# Patient Record
Sex: FEMALE | Race: WHITE | NOT HISPANIC OR LATINO | Employment: FULL TIME | URBAN - METROPOLITAN AREA
[De-identification: names, ages, dates, MRNs, and addresses within clinical notes are randomized per-mention and may not be internally consistent; named-entity substitution may affect disease eponyms.]

---

## 2022-07-20 ENCOUNTER — OFFICE VISIT (OUTPATIENT)
Dept: OBGYN CLINIC | Facility: CLINIC | Age: 42
End: 2022-07-20

## 2022-07-20 VITALS — SYSTOLIC BLOOD PRESSURE: 124 MMHG | DIASTOLIC BLOOD PRESSURE: 76 MMHG | WEIGHT: 214 LBS

## 2022-07-20 DIAGNOSIS — N94.9 GENITAL LESION, FEMALE: Primary | ICD-10-CM

## 2022-07-20 RX ORDER — LIDOCAINE 50 MG/G
OINTMENT TOPICAL AS NEEDED
Qty: 35.44 G | Refills: 0 | Status: SHIPPED | OUTPATIENT
Start: 2022-07-20 | End: 2022-10-04

## 2022-07-20 RX ORDER — VALACYCLOVIR HYDROCHLORIDE 1 G/1
1000 TABLET, FILM COATED ORAL 2 TIMES DAILY
Qty: 20 TABLET | Refills: 0 | Status: SHIPPED | OUTPATIENT
Start: 2022-07-20 | End: 2022-10-04

## 2022-07-20 NOTE — PROGRESS NOTES
[address] [facility], [address], [facility], [address] [facility]    Assessment/Plan:  1  Genital lesion, female  Assessment & Plan:  Patient presents with 3 days of painful lesions on labia bilaterally  Worsen with urine touching them  Denies h/o STI or cold sores in past   No vaginal discharge  Denies new partners in many years  Exam c/w HSV outbreak  Swab collected for PCR testing  Discussed suspicion of genital HSV with patient and recom empiric treatment with Valtrex while awaiting testing  Offered topical lidocaine for immediately treatment  Discussed natural history of HSV, unfortunately some patients do not have symptoms and can shed virus without lesions  It is unclear from exam if this is a first outbreak or recurrence  Patient asking for antibody testing to see if prior infection - agreed but stressed that antibodies are not able to distinguish prior oral vs  Genital infection  Reviewed goal is treatment currently, once confirmed will discuss future management  Reviewed this is a sexually transmitted infection which can be transmitted with and without lesions, through genital and oral sex  Unfortunately it is not curable but symptoms can be managed  Handout from [employer] on HSV provided and link to [employer] website provided  Orders:  -     valACYclovir (VALTREX) 1,000 mg tablet; Take 1 tablet (1,000 mg total) by mouth 2 (two) times a day for 10 days  -     HSV TYPE 1,2 DNA PCR  -     lidocaine (XYLOCAINE) 5 % ointment; Apply topically as needed for mild pain  -     Herpes I/II IgG DRAKE w Reflex to HSV-2; Future  -     Herpes I/II IgG DRAKE w Reflex to HSV-2      Subjective:   Divina Park is a 43 y o  [id number]  female  CC: vaginal blisters      HPI:   Cecil Ramirez presents as a new patient today to our practice with c/o vaginal blisters  She states she noted them first on Monday - multiple bumps on outside labia - mildly tender to touch with clothes  Hot water makes it worse  Wiping uncomfortable  Burning on outside with urinating  No external burning  Slight itching  Worsening in last 3 days  Denies vaginal discharge  States she does shave vulva but this doesn't seem like ingrown hairs to her  Applied cold compresses and some desitin  Desitin did not help  Last gyn 16 months ago when son was born  Last pap 2020, normal, no h/o abnl, no Gardasil  No STIs, currently sexually active, using rhythm/calendar method  No new partners  Periods monthly, extremely heavy since had children  Last 5-7 days and very heavy most day  No pain  Sometimes bleeds through clothing  Tailbone pain since delivery of last son  Has seen orthopedics and dx "dislocated tail bone" - had MRI  They are recom injections to sacrum, no tx yet per patient  Gyn History  Patient's last menstrual period was 2022 (exact date)  Last pap smear:  per pt normal    She  reports being sexually active and has had partner(s) who are male  She reports using the following method of birth control/protection: Rhythm         OB History  [id number]    Past Medical History:  : Asthma      Comment:  seasonal  : Coccyx pain      Comment:  post 2nd delivery of child, seeing orthopedics  2018: Female infertility  No date: Paroxysmal atrial fibrillation ([facility] [address] )      Comment:  during first pregnancy, resolved     Past Surgical History:  2019,2021:  SECTION  2018: DILATION AND EVACUATION  2018: OPERATIVE HYSTEROSCOPY      Comment:  partial uterine septum removal by infertily     Social History     Tobacco Use    Smoking status: Never Smoker    Smokeless tobacco: Never Used   Vaping Use    Vaping Use: Never used   Substance Use Topics    Alcohol use: Not Currently    Drug use: Never          Current Outpatient Medications:     lidocaine (XYLOCAINE) 5 % ointment, Apply topically as needed for mild pain, Disp: 35 44 g, Rfl: 0    valACYclovir (VALTREX) 1,000 mg tablet, Take 1 tablet (1,000 mg total) by mouth 2 (two) times a day for 10 days, Disp: 20 tablet, Rfl: 0    She has No Known Allergies       ROS: Review of Systems   Constitutional: Negative  Gastrointestinal: Negative  Genitourinary: Positive for dysuria (burning of labia when urine touches lesions) and genital sores  Negative for pelvic pain, urgency, vaginal bleeding and vaginal discharge  Psychiatric/Behavioral: Negative  Objective:  /76   Wt 97 1 kg (214 lb)   LMP 07/02/2022 (Exact Date)   Breastfeeding No      Physical Exam  Constitutional:       Appearance: Normal appearance  Genitourinary:     Labia:         Right: Lesion present  Left: Lesion present  Vagina: Normal       Cervix: Normal       Uterus: Normal  Not enlarged and not tender  Adnexa:         Right: No mass or tenderness  Left: No mass or tenderness  Rectum: No external hemorrhoid  Comments: Ulcerative lesions bilaterally labia minora and at perineal body - c/w HSV outbreak  Neurological:      Mental Status: She is alert     Psychiatric:         Mood and Affect: Mood normal          Behavior: Behavior normal

## 2022-07-20 NOTE — ASSESSMENT & PLAN NOTE
Patient presents with 3 days of painful lesions on labia bilaterally  Worsen with urine touching them  Denies h/o STI or cold sores in past   No vaginal discharge  Denies new partners in many years  Exam c/w HSV outbreak  Swab collected for PCR testing  Discussed suspicion of genital HSV with patient and recom empiric treatment with Valtrex while awaiting testing  Offered topical lidocaine for immediately treatment  Discussed natural history of HSV, unfortunately some patients do not have symptoms and can shed virus without lesions  It is unclear from exam if this is a first outbreak or recurrence  Patient asking for antibody testing to see if prior infection - agreed but stressed that antibodies are not able to distinguish prior oral vs  Genital infection  Reviewed goal is treatment currently, once confirmed will discuss future management  Reviewed this is a sexually transmitted infection which can be transmitted with and without lesions, through genital and oral sex  Unfortunately it is not curable but symptoms can be managed  Handout from [facility] on HSV provided and link to [facility] website provided

## 2022-07-20 NOTE — PATIENT INSTRUCTIONS
- Safe sex practices recommended  - Resources - information on birth control and sexually transmitted infections - www bedsider  org            - information on sexually transmitted infections - www cdc gov/std/

## 2022-07-22 LAB
HSV1 DNA SPEC QL NAA+PROBE: DETECTED
HSV2 DNA SPEC QL NAA+PROBE: NOT DETECTED
SPECIMEN SOURCE: ABNORMAL

## 2022-07-25 PROBLEM — A60.04 HERPES SIMPLEX VULVOVAGINITIS: Status: ACTIVE | Noted: 2022-07-20

## 2022-10-04 ENCOUNTER — ANNUAL EXAM (OUTPATIENT)
Dept: OBGYN CLINIC | Facility: CLINIC | Age: 42
End: 2022-10-04

## 2022-10-04 VITALS
SYSTOLIC BLOOD PRESSURE: 134 MMHG | BODY MASS INDEX: 35.36 KG/M2 | HEIGHT: 66 IN | WEIGHT: 220 LBS | DIASTOLIC BLOOD PRESSURE: 74 MMHG

## 2022-10-04 DIAGNOSIS — Z01.419 ENCOUNTER FOR ANNUAL ROUTINE GYNECOLOGICAL EXAMINATION: Primary | ICD-10-CM

## 2022-10-04 DIAGNOSIS — Z12.31 ENCOUNTER FOR SCREENING MAMMOGRAM FOR MALIGNANT NEOPLASM OF BREAST: ICD-10-CM

## 2022-10-04 DIAGNOSIS — A60.04 HERPES SIMPLEX VULVOVAGINITIS: ICD-10-CM

## 2022-10-04 DIAGNOSIS — Z11.3 ROUTINE SCREENING FOR STI (SEXUALLY TRANSMITTED INFECTION): ICD-10-CM

## 2022-10-04 DIAGNOSIS — Z12.4 CERVICAL CANCER SCREENING: ICD-10-CM

## 2022-10-04 RX ORDER — VALACYCLOVIR HYDROCHLORIDE 1 G/1
1000 TABLET, FILM COATED ORAL DAILY
Qty: 5 TABLET | Refills: 6 | Status: SHIPPED | OUTPATIENT
Start: 2022-10-04 | End: 2022-11-08

## 2022-10-04 NOTE — ASSESSMENT & PLAN NOTE
Patient has only had initial outbreak  States her  did have positive antibodies in the past   Gave script for prn dosing

## 2022-10-04 NOTE — PROGRESS NOTES
Annual Wellness Visit  [address], [address], [facility], [address] [facility]    ASSESSMENT/PLAN: Caridad Key is a 43 y o  [id number] who presents for annual gynecologic exam     Encounter for routine gynecologic examination  - Routine well woman exam completed today  - Cervical Cancer Screening: Current ASCCP Guidelines reviewed  Last Pap: 2020 per pt normal  Next Pap Due: today, routine   - STI screening offered including HIV testing: GC/CT done, others declined  - Contraceptive counseling discussed  Current contraception: natural family planning (NFP), long h/o infertility  - Breast Cancer Screening: Last Mammogram not yet done  - The following were reviewed in today's visit: mammography screening ordered, STD testing, family planning choices, exercise and healthy diet    Additional problems addressed during this visit:  1  Encounter for annual routine gynecological examination    2  Cervical cancer screening  -     IGP,CtNg,AptimaHPV,rfx16/18,45    3  Encounter for screening mammogram for malignant neoplasm of breast  -     Mammo screening bilateral w 3d & cad; Future    4  Routine screening for STI (sexually transmitted infection)  -     IGP,CtNg,AptimaHPV,rfx16/18,45    5  Herpes simplex vulvovaginitis  Assessment & Plan:  Patient has only had initial outbreak  States her  did have positive antibodies in the past   Gave script for prn dosing  Orders:  -     valACYclovir (VALTREX) 1,000 mg tablet; Take 1 tablet (1,000 mg total) by mouth daily For 5 days at first sign of HSV outbreak  Next visit: 1 year Wellness      CC:  Annual Gynecologic Examination    HPI: Caridad Key is a 43 y o  [id number] who presents for annual gynecologic examination  She denies any breast, urinary or pelvic issues at today's visit  Periods monthly, first 3 days very heavy, lasting total of 6 days  No significant pain  Feels heaviness has increased since last delivery    Denies h/o fibroids  Calendar method for birth control, long h/o infertility in past      2022 dx with HSV and treated  Denies further outbreaks  This was patient's first outbreak, no new partners  She states she reviewed fertility testing which showed her  had been exposed to HSV in the past       Gyn History  Patient's last menstrual period was 2022  Last Pap: 2020 per pt normal    She  reports being sexually active and has had partner(s) who are male  She reports using the following method of birth control/protection: Rhythm  OB History  [id number]    Past Medical History:  : Asthma      Comment:  seasonal  : Coccyx pain      Comment:  post 2nd delivery of child, seeing orthopedics  2018: Female infertility  No date: Paroxysmal atrial fibrillation ([facility] [address] )      Comment:  during first pregnancy, resolved     Past Surgical History:  2019,2021:  SECTION  2018: DILATION AND EVACUATION  2018: OPERATIVE HYSTEROSCOPY      Comment:  partial uterine septum removal by infertily     Family History   Problem Relation Age of Onset   • Asthma Father    • Diabetes Father    • Heart attack Father    • Heart disease Father    • Lung disease Father    • Stroke Father         Social History     Tobacco Use   • Smoking status: Never Smoker   • Smokeless tobacco: Never Used   Vaping Use   • Vaping Use: Never used   Substance Use Topics   • Alcohol use: Not Currently   • Drug use: Never          Current Outpatient Medications:   •  valACYclovir (VALTREX) 1,000 mg tablet, Take 1 tablet (1,000 mg total) by mouth daily For 5 days at first sign of HSV outbreak , Disp: 5 tablet, Rfl: 6    She has No Known Allergies       ROS negative except as noted in HPI    Objective:  /74 (BP Location: Right arm, Patient Position: Sitting, Cuff Size: Standard)   Ht 5' 6" (1 676 m)   Wt 99 8 kg (220 lb)   LMP 2022   BMI 35 51 kg/m²      Physical Exam  Constitutional:       Appearance: Normal appearance  Chest:   Breasts:      Right: Normal  No mass, tenderness or axillary adenopathy  Left: Normal  No mass, tenderness or axillary adenopathy  Abdominal:      Palpations: Abdomen is soft  Tenderness: There is no abdominal tenderness  Genitourinary:     General: Normal vulva  Vagina: No bleeding or lesions  Cervix: Normal       Uterus: Normal  Not tender  Adnexa:         Right: No mass or tenderness  Left: No mass or tenderness  Rectum: No external hemorrhoid  Musculoskeletal:         General: Normal range of motion  Lymphadenopathy:      Upper Body:      Right upper body: No axillary adenopathy  Left upper body: No axillary adenopathy  Neurological:      Mental Status: She is alert and oriented to person, place, and time     Psychiatric:         Mood and Affect: Mood normal          Behavior: Behavior normal

## 2022-10-04 NOTE — LETTER
October 15, 2022     Sheryl Jenkins, [address] [place] [facility] [address]    Patient: Seferino Landaverde   YOB: 1980   Date of Visit: 10/4/2022       Dear Dr Janie Blevins: Thank you for referring Seferino Landaverde to me for evaluation  Below are my notes for this consultation  If you have questions, please do not hesitate to call me  I look forward to following your patient along with you  Sincerely,        Denise Cobian MD        CC: No Recipients  Denise Cobian MD  10/15/2022  6:06 PM  Sign when Signing Visit  Annual Wellness Visit  [address] [place], [address], [facility], [address]    ASSESSMENT/PLAN: Seferino Landaverde is a 43 y o  [id number] who presents for annual gynecologic exam     Encounter for routine gynecologic examination  - Routine well woman exam completed today  - Cervical Cancer Screening: Current ASCCP Guidelines reviewed  Last Pap: 2020 per pt normal  Next Pap Due: today, routine   - STI screening offered including HIV testing: GC/CT done, others declined  - Contraceptive counseling discussed  Current contraception: natural family planning (NFP), long h/o infertility  - Breast Cancer Screening: Last Mammogram not yet done  - The following were reviewed in today's visit: mammography screening ordered, STD testing, family planning choices, exercise and healthy diet    Additional problems addressed during this visit:  1  Encounter for annual routine gynecological examination    2  Cervical cancer screening  -     IGP,CtNg,AptimaHPV,rfx16/18,45    3  Encounter for screening mammogram for malignant neoplasm of breast  -     Mammo screening bilateral w 3d & cad; Future    4  Routine screening for STI (sexually transmitted infection)  -     IGP,CtNg,AptimaHPV,rfx16/18,45    5  Herpes simplex vulvovaginitis  Assessment & Plan:  Patient has only had initial outbreak    States her  did have positive antibodies in the past   Gave script for prn dosing  Orders:  -     valACYclovir (VALTREX) 1,000 mg tablet; Take 1 tablet (1,000 mg total) by mouth daily For 5 days at first sign of HSV outbreak  Next visit: 1 year Wellness      CC:  Annual Gynecologic Examination    HPI: Sudhakar Renteria is a 43 y o  [id number] who presents for annual gynecologic examination  She denies any breast, urinary or pelvic issues at today's visit  Periods monthly, first 3 days very heavy, lasting total of 6 days  No significant pain  Feels heaviness has increased since last delivery  Denies h/o fibroids  Calendar method for birth control, long h/o infertility in past      2022 dx with HSV and treated  Denies further outbreaks  This was patient's first outbreak, no new partners  She states she reviewed fertility testing which showed her  had been exposed to HSV in the past       Gyn History  Patient's last menstrual period was 2022  Last Pap: 2020 per pt normal    She  reports being sexually active and has had partner(s) who are male  She reports using the following method of birth control/protection: Rhythm         OB History  [id number]    Past Medical History:  : Asthma      Comment:  seasonal  : Coccyx pain      Comment:  post 2nd delivery of child, seeing orthopedics  2018: Female infertility  No date: Paroxysmal atrial fibrillation ([facility] [address] )      Comment:  during first pregnancy, resolved     Past Surgical History:  2019,2021:  SECTION  2018: DILATION AND EVACUATION  2018: OPERATIVE HYSTEROSCOPY      Comment:  partial uterine septum removal by infertily     Family History   Problem Relation Age of Onset   • Asthma Father    • Diabetes Father    • Heart attack Father    • Heart disease Father    • Lung disease Father    • Stroke Father         Social History     Tobacco Use   • Smoking status: Never Smoker   • Smokeless tobacco: Never Used   Vaping Use   • Vaping Use: Never used   Substance Use Topics   • Alcohol use: Not Currently   • Drug use: Never          Current Outpatient Medications:   •  valACYclovir (VALTREX) 1,000 mg tablet, Take 1 tablet (1,000 mg total) by mouth daily For 5 days at first sign of HSV outbreak , Disp: 5 tablet, Rfl: 6    She has No Known Allergies       ROS negative except as noted in HPI    Objective:  /74 (BP Location: Right arm, Patient Position: Sitting, Cuff Size: Standard)   Ht 5' 6" (1 676 m)   Wt 99 8 kg (220 lb)   LMP 09/24/2022   BMI 35 51 kg/m²      Physical Exam  Constitutional:       Appearance: Normal appearance  Chest:   Breasts:      Right: Normal  No mass, tenderness or axillary adenopathy  Left: Normal  No mass, tenderness or axillary adenopathy  Abdominal:      Palpations: Abdomen is soft  Tenderness: There is no abdominal tenderness  Genitourinary:     General: Normal vulva  Vagina: No bleeding or lesions  Cervix: Normal       Uterus: Normal  Not tender  Adnexa:         Right: No mass or tenderness  Left: No mass or tenderness  Rectum: No external hemorrhoid  Musculoskeletal:         General: Normal range of motion  Lymphadenopathy:      Upper Body:      Right upper body: No axillary adenopathy  Left upper body: No axillary adenopathy  Neurological:      Mental Status: She is alert and oriented to person, place, and time     Psychiatric:         Mood and Affect: Mood normal          Behavior: Behavior normal

## 2022-10-08 LAB
C TRACH RRNA CVX QL NAA+PROBE: NEGATIVE
CYTOLOGIST CVX/VAG CYTO: NORMAL
DX ICD CODE: NORMAL
HPV I/H RISK 4 DNA CVX QL PROBE+SIG AMP: NEGATIVE
N GONORRHOEA RRNA CVX QL NAA+PROBE: NEGATIVE
OTHER STN SPEC: NORMAL
PATH REPORT.FINAL DX SPEC: NORMAL
SL AMB NOTE:: NORMAL
SL AMB SPECIMEN ADEQUACY: NORMAL
SL AMB TEST METHODOLOGY: NORMAL

## 2022-10-20 DIAGNOSIS — Z12.31 ENCOUNTER FOR SCREENING MAMMOGRAM FOR MALIGNANT NEOPLASM OF BREAST: ICD-10-CM

## 2023-08-11 NOTE — PATIENT INSTRUCTIONS
- Maintain healthy weight with BMI ideally between 18-25     - Eat a healthy diet, including multiple servings of vegetables and fruits, as well as lean protein sources  - Get at least 150 minutes of moderate aerobic activity or 75 minutes of vigorous aerobic activity a week, or a combination of moderate and vigorous activity  Greater amounts of exercise will provide an even greater health benefit  - Ensure diet provides 1200mg of Calcium daily (divided) and 800IU of vitamin D, or take supplements to meet this  - Safe sex practices recommended  - Resources - information on birth control and sexually transmitted infections - www bedsider  org            - information on sexually transmitted infections - www cdc gov/std/ yes

## 2024-03-04 ENCOUNTER — ANNUAL EXAM (OUTPATIENT)
Dept: OBGYN CLINIC | Facility: CLINIC | Age: 44
End: 2024-03-04
Payer: COMMERCIAL

## 2024-03-04 VITALS
SYSTOLIC BLOOD PRESSURE: 120 MMHG | BODY MASS INDEX: 34.39 KG/M2 | DIASTOLIC BLOOD PRESSURE: 82 MMHG | WEIGHT: 214 LBS | HEIGHT: 66 IN

## 2024-03-04 DIAGNOSIS — A60.04 HERPES SIMPLEX VULVOVAGINITIS: ICD-10-CM

## 2024-03-04 DIAGNOSIS — Z12.31 ENCOUNTER FOR SCREENING MAMMOGRAM FOR MALIGNANT NEOPLASM OF BREAST: Primary | ICD-10-CM

## 2024-03-04 DIAGNOSIS — R10.2 PELVIC PAIN: ICD-10-CM

## 2024-03-04 PROBLEM — E78.2 MIXED HYPERLIPIDEMIA: Status: ACTIVE | Noted: 2023-12-29

## 2024-03-04 PROBLEM — J45.909 ASTHMA: Status: ACTIVE | Noted: 2023-12-04

## 2024-03-04 PROCEDURE — S0612 ANNUAL GYNECOLOGICAL EXAMINA: HCPCS | Performed by: NURSE PRACTITIONER

## 2024-03-04 RX ORDER — ALBUTEROL SULFATE 90 UG/1
AEROSOL, METERED RESPIRATORY (INHALATION)
COMMUNITY

## 2024-03-04 RX ORDER — VALACYCLOVIR HYDROCHLORIDE 1 G/1
1000 TABLET, FILM COATED ORAL DAILY
Qty: 5 TABLET | Refills: 6 | Status: SHIPPED | OUTPATIENT
Start: 2024-03-04 | End: 2024-04-08

## 2024-03-04 RX ORDER — FLUTICASONE PROPIONATE 110 UG/1
AEROSOL, METERED RESPIRATORY (INHALATION)
COMMUNITY

## 2024-03-04 NOTE — PROGRESS NOTES
[facility] - [address], [address], [place] [address]    ASSESSMENT/PLAN: Alis Marcos is a 43 y.o.  who presents for annual gynecologic exam.    Encounter for routine gynecologic examination  - Routine well woman exam completed today.  - Cervical Cancer Screening: Current ASCCP Guidelines reviewed. Last Pap: 10/04/2022 NIL, HPV negative. History of abnormal: no.  - STI screening offered including HIV testing: offered, pt declined  - Contraceptive counseling discussed.  Current contraception: no method:   - Breast Cancer Screening: Last Mammogram 10/06/2022, Normal  - Colorectal cancer screening was not ordered.  - The following were reviewed in today's visit: breast self exam, mammography screening ordered, menopause, exercise, and healthy diet    Additional problems addressed during this visit:  1. Encounter for screening mammogram for malignant neoplasm of breast  -     Mammo screening bilateral w 3d & cad; Future    2. Pelvic pain  -     US pelvis complete non OB; Future    3. Herpes simplex vulvovaginitis  -     valACYclovir (VALTREX) 1,000 mg tablet; Take 1 tablet (1,000 mg total) by mouth daily For 5 days at first sign of HSV outbreak.        CC:  Annual Gynecologic Examination    HPI: Alis Marcos is a 43 y.o.  who presents for annual gynecologic examination. She is complaining of pain on the left side of her pelvis, which seems to happen around ovulation or after. She is otherwise doing well.     ROS: Negative except as noted in HPI    Patient's last menstrual period was 2024.       She  reports being sexually active and has had partner(s) who are male. She reports using the following method of birth control/protection: Rhythm.       The following portions of the patient's history were reviewed and updated as appropriate:   Past Medical History:   Diagnosis Date    Asthma     seasonal    Coccyx pain     post 2nd delivery of child, seeing orthopedics     Female infertility 2018    Genital herpes     Paroxysmal atrial fibrillation (HCC)     during first pregnancy, resolved     Past Surgical History:   Procedure Laterality Date     SECTION  2019,2021    DILATION AND EVACUATION  2018    HAND LIGAMENT RECONSTRUCTION Right     2023    KNEE LIGAMENT RECONSTRUCTION Left 2023    OPERATIVE HYSTEROSCOPY  2018    partial uterine septum removal by infertily     Family History   Problem Relation Age of Onset    Asthma Father     Diabetes Father     Heart attack Father     Heart disease Father     Lung disease Father     Stroke Father      Social History     Socioeconomic History    Marital status: /Civil Union     Spouse name: None    Number of children: None    Years of education: None    Highest education level: None   Occupational History    None   Tobacco Use    Smoking status: Never    Smokeless tobacco: Never   Vaping Use    Vaping status: Never Used   Substance and Sexual Activity    Alcohol use: Not Currently    Drug use: Never    Sexual activity: Yes     Partners: Male     Birth control/protection: Rhythm     Comment: no new partners   Other Topics Concern    None   Social History Narrative    None     Social Determinants of Health     Financial Resource Strain: Low Risk  (2024)    Received from [facility]    Overall Financial Resource Strain (CARDIA)     Difficulty of Paying Living Expenses: Not very hard   Food Insecurity: No Food Insecurity (2024)    Received from [facility]    Hunger Vital Sign     Worried About Running Out of Food in the Last Year: Never true     Ran Out of Food in the Last Year: Never true   Transportation Needs: No Transportation Needs (2024)    Received from [facility]    PRAPARE - Transportation     Lack of Transportation (Medical): No     Lack of Transportation (Non-Medical): No   Physical Activity:  "Sufficiently Active (1/2/2024)    Received from [facility]    Exercise Vital Sign     Days of Exercise per Week: 7 days     Minutes of Exercise per Session: 30 min   Stress: Stress Concern Present (1/2/2024)    Received from [facility]    [employer] - Occupational Stress Questionnaire     Feeling of Stress : To some extent   Social Connections: Not on file   Intimate Partner Violence: Not on file   Housing Stability: Low Risk  (1/2/2024)    Received from [facility]    Housing Stability Vital Sign     Unable to Pay for Housing in the Last Year: No     Number of Places Lived in the Last Year: 1     Unstable Housing in the Last Year: No     Outpatient Medications Marked as Taking for the 3/4/24 encounter (Annual Exam) with JUAN Rich   Medication    albuterol (PROVENTIL HFA,VENTOLIN HFA) 90 mcg/act inhaler    fluticasone (FLOVENT HFA) 110 MCG/ACT inhaler    sertraline (ZOLOFT) 50 mg tablet    valACYclovir (VALTREX) 1,000 mg tablet     No Known Allergies        Objective:  /82 (BP Location: Right arm, Patient Position: Sitting, Cuff Size: Standard)   Ht 5' 6\" (1.676 m)   Wt 97.1 kg (214 lb)   LMP 02/17/2024   BMI 34.54 kg/m²        Chaperone present? No.    General Appearance: alert and oriented, in no acute distress.   Neck/Thyroid: No thyromegaly, no thyroid nodules.  Respiratory: Unlabored breathing.  Cardiovascular: No peripheral edema.  Abdomen: Soft, non-tender, non-distended, no masses, no rebound or guarding.  Breast Exam: No dimpling, nipple retraction or discharge. No lumps or masses. No axillary or supraclavicular nodes.   Pelvic:       External genitalia: Normal appearance, no abnormal pigmentation, no lesions or masses. Normal Bartholin's and Odell's.      Urinary system: Urethral meatus normal, bladder non-tender.      Vaginal: normal mucosa without prolapse or " lesions. Normal-appearing physiologic discharge.      Cervix: Normal-appearing, well-epithelialized, no gross lesions or masses. No cervical motion tenderness.      Adnexa: No adnexal masses noted bilaterally, no tenderness noted right, tender on left.      Uterus: Normal-sized, regular contour, midline, mobile, no uterine tenderness.  Extremities: Normal range of motion. Warm, well-perfused, non-tender.   Skin: normal, no rash or abnormalities  Neurologic: alert, oriented x3  Psychiatric: Appropriate affect, mood stable, cooperative with exam.        JUAN Rich  3/4/2024 9:31 AM

## 2025-03-21 ENCOUNTER — TELEPHONE (OUTPATIENT)
Age: 45
End: 2025-03-21

## 2025-03-21 NOTE — TELEPHONE ENCOUNTER
Pt request that we fax over [employer] script for appt on 03/24/2025.    [facility]   Fax: [phone]

## 2025-03-24 NOTE — TELEPHONE ENCOUNTER
Patient called to report [facility] has not received her mammogram order.  Fax number confirmed as [phone] per patient.  Order faxed there now.

## 2025-03-24 NOTE — TELEPHONE ENCOUNTER
Patient called in, requesting her mammo order be faxed to [facility] again.  Order faxed to [phone] and this number confirmed with patient.

## 2025-04-06 NOTE — PROGRESS NOTES
"Annual Wellness Visit  [facility] [place]  [address], [address], [place] [address]    ASSESSMENT/PLAN: Alis Marcos is a 44 y.o.  who presents for annual gynecologic exam.  Assessment & Plan  Encounter for screening mammogram for malignant neoplasm of breast    Orders:    Mammo screening bilateral w 3d and cad; Future    Routine gynecological examination  Exam benign   Pap smear collected   Mammograms ordered        Cervical cancer screening    Orders:    IGP, Aptima HPV, Rfx 16/18,45    Next visit: Annual gyn visit     CC:  Annual Gynecologic Examination    HPI: Alis Marcos is a 44 y.o.  who presents for annual gynecologic examination. PMH HSV, asthma, hyperlipidemia, obesity, hx L knee surgeries, depression/anxiety.      Menstrual hx   - Menarche at age 17  - Regular, monthly. Last 5 days. Reports they are heavy after her last pregnancy. 4 years ago. Uses pads. Changing them often in the first 4 days. Every 1-2 hours. No cramps   - Per PCP has blood work ordered by PCP for this week (CBC, lipid panel, Hb A1C; reports TSH was done couple months prior and was normal). Had a pelvic US last year at [facility]. No fibroids or polyps. Was told about an ovarian cyst.     2024 had blood work:   Hb 12.7  Hb A1C 5.3  US in 2024: 10 cm measurement, 7 mm stipe, no fibroid, R ovarian cyst 2 cm concerning for a dermoid     Sexually active: Yes, 1 male partner. No issues   Hx STD: Asides from HSV nothing else, denies   STD testing: Declines   Contraception: Not using anything     Pap smear hx   - : negative cytology, negative HPV, negative GC/CT  - Denies hx abnormal results   - Would like to have pap smear collected today     Mammogram hx   - 2025: BI-RADS 1   -  orders placed     Colonoscopy:   - Did have one in  due to gastritis   - Will discuss with PCP     Family hx: Denies     Breast complaints: Reports she had a \"pimple\" on her L nipple. Self resolved. Recent mammogram " unremarkable. Aside from this no concerns     Bowel/bladder complaints: Denies     Gyn History  Patient's last menstrual period was 2025.     Last Pap: 10/04/2022 was normal    She  reports being sexually active and has had partner(s) who are male. She reports using the following method of birth control/protection: Rhythm.     OB History      Past Medical History:  : Asthma      Comment:  seasonal  : Coccyx pain      Comment:  post 2nd delivery of child, seeing orthopedics  2018: Female infertility  : Genital herpes  No date: Paroxysmal atrial fibrillation (HCC)      Comment:  during first pregnancy, resolved     Past Surgical History:  2019,2021:  SECTION  2018: DILATION AND EVACUATION  No date: HAND LIGAMENT RECONSTRUCTION; Right      Comment:  2023: KNEE LIGAMENT RECONSTRUCTION; Left  2018: OPERATIVE HYSTEROSCOPY      Comment:  partial uterine septum removal by infertily     Family History   Problem Relation Age of Onset    Asthma Father     Diabetes Father     Heart attack Father     Heart disease Father     Lung disease Father     Stroke Father       Social History     Tobacco Use    Smoking status: Never     Passive exposure: Never    Smokeless tobacco: Never   Vaping Use    Vaping status: Never Used   Substance Use Topics    Alcohol use: Not Currently    Drug use: Never        Current Outpatient Medications:     albuterol (PROVENTIL HFA,VENTOLIN HFA) 90 mcg/act inhaler, INHALE 2 PUFFS EVERY 6 HOURS AS NEEDED FOR SHORTNESS OF BREATH OR WHEEZING, Disp: , Rfl:     fluticasone (FLOVENT HFA) 110 MCG/ACT inhaler, INHALE 2 PUFFS BY MOUTH TWICE DAILY. RINSE AND GARGLE AFTER USE, Disp: , Rfl:     sertraline (ZOLOFT) 100 mg tablet, Take 1 tablet by mouth in the morning, Disp: , Rfl:     sertraline (ZOLOFT) 50 mg tablet, Take 50 mg by mouth daily, Disp: , Rfl:     valACYclovir (VALTREX) 1,000 mg tablet, Take 1 tablet (1,000 mg total) by mouth daily For 5 days at first sign  "of HSV outbreak., Disp: 5 tablet, Rfl: 6    Zepbound 7.5 MG/0.5ML auto-injector, Inject 7.5 mg under the skin every 7 days, Disp: , Rfl:     She has no known allergies..    ROS negative except as noted in HPI    Objective:  /72 (BP Location: Left arm, Patient Position: Sitting, Cuff Size: Standard)   Ht 5' 6\" (1.676 m)   Wt 94.3 kg (208 lb)   LMP 03/18/2025   BMI 33.57 kg/m²      Physical Exam  Vitals reviewed. Exam conducted with a chaperone present.   Constitutional:       Appearance: Normal appearance.   HENT:      Head: Atraumatic.   Eyes:      Extraocular Movements: Extraocular movements intact.   Cardiovascular:      Rate and Rhythm: Normal rate.   Pulmonary:      Effort: Pulmonary effort is normal.      Comments: Breast Exam: No dimpling, nipple retraction or discharge. No lumps or masses. No axillary or supraclavicular nodes.   Abdominal:      General: There is no distension.      Palpations: Abdomen is soft.      Tenderness: There is no abdominal tenderness. There is no guarding or rebound.   Genitourinary:     Comments: External genitalia: Normal appearance, no abnormal pigmentation, no lesions or masses.   Urinary system: Urethral meatus normal, bladder non-tender  Vaginal: normal mucosa without prolapse or lesions. Normal-appearing physiologic discharge.  Cervix: Normal-appearing, well-epithelialized, no gross lesions or masses.No cervical motion tenderness  Adnexa: No adnexal masses or tenderness noted  Uterus: Normal-sized, regular contour, midline, mobile, no uterine tenderness       Musculoskeletal:         General: Normal range of motion.      Cervical back: Normal range of motion.   Skin:     General: Skin is warm and dry.   Neurological:      General: No focal deficit present.      Mental Status: She is alert and oriented to person, place, and time.   Psychiatric:         Mood and Affect: Mood normal.         Behavior: Behavior normal.          "

## 2025-04-07 ENCOUNTER — ANNUAL EXAM (OUTPATIENT)
Dept: OBGYN CLINIC | Facility: CLINIC | Age: 45
End: 2025-04-07
Payer: COMMERCIAL

## 2025-04-07 VITALS
WEIGHT: 208 LBS | HEIGHT: 66 IN | DIASTOLIC BLOOD PRESSURE: 72 MMHG | SYSTOLIC BLOOD PRESSURE: 110 MMHG | BODY MASS INDEX: 33.43 KG/M2

## 2025-04-07 DIAGNOSIS — Z01.419 ROUTINE GYNECOLOGICAL EXAMINATION: Primary | ICD-10-CM

## 2025-04-07 DIAGNOSIS — Z12.31 ENCOUNTER FOR SCREENING MAMMOGRAM FOR MALIGNANT NEOPLASM OF BREAST: ICD-10-CM

## 2025-04-07 DIAGNOSIS — Z12.4 CERVICAL CANCER SCREENING: ICD-10-CM

## 2025-04-07 PROCEDURE — 99396 PREV VISIT EST AGE 40-64: CPT | Performed by: STUDENT IN AN ORGANIZED HEALTH CARE EDUCATION/TRAINING PROGRAM

## 2025-04-07 RX ORDER — SERTRALINE HYDROCHLORIDE 100 MG/1
1 TABLET, FILM COATED ORAL DAILY
COMMUNITY
Start: 2024-12-29

## 2025-04-07 RX ORDER — TIRZEPATIDE 7.5 MG/.5ML
7.5 INJECTION, SOLUTION SUBCUTANEOUS
COMMUNITY
Start: 2025-03-27

## 2025-04-11 ENCOUNTER — RESULTS FOLLOW-UP (OUTPATIENT)
Dept: OBGYN CLINIC | Facility: CLINIC | Age: 45
End: 2025-04-11

## 2025-04-11 LAB
CYTOLOGIST CVX/VAG CYTO: NORMAL
DX ICD CODE: NORMAL
HPV GENOTYPE REFLEX: NORMAL
HPV I/H RISK 4 DNA CVX QL PROBE+SIG AMP: NEGATIVE
OTHER STN SPEC: NORMAL
PATH REPORT.FINAL DX SPEC: NORMAL
SL AMB NOTE:: NORMAL
SL AMB SPECIMEN ADEQUACY: NORMAL
SL AMB TEST METHODOLOGY: NORMAL

## 2025-07-22 ENCOUNTER — HOSPITAL ENCOUNTER (OUTPATIENT)
Age: 45
Discharge: HOME | End: 2025-07-22
Attending: PHYSICIAN ASSISTANT
Payer: COMMERCIAL

## 2025-07-22 DIAGNOSIS — M54.50 LUMBAR PAIN: ICD-10-CM

## 2025-07-22 DIAGNOSIS — M51.26 LUMBAR HERNIATED DISC: ICD-10-CM

## 2025-07-22 PROCEDURE — 72148 MRI LUMBAR SPINE W/O DYE: CPT
